# Patient Record
Sex: FEMALE | Race: WHITE | Employment: UNEMPLOYED | URBAN - NONMETROPOLITAN AREA
[De-identification: names, ages, dates, MRNs, and addresses within clinical notes are randomized per-mention and may not be internally consistent; named-entity substitution may affect disease eponyms.]

---

## 2020-10-03 ENCOUNTER — HOSPITAL ENCOUNTER (EMERGENCY)
Age: 60
Discharge: HOME | End: 2020-10-03
Attending: FAMILY MEDICINE

## 2020-10-03 ENCOUNTER — APPOINTMENT (OUTPATIENT)
Dept: GENERAL RADIOLOGY | Age: 60
End: 2020-10-03

## 2020-10-03 VITALS
WEIGHT: 174.9 LBS | DIASTOLIC BLOOD PRESSURE: 108 MMHG | RESPIRATION RATE: 20 BRPM | TEMPERATURE: 97.4 F | OXYGEN SATURATION: 97 % | HEART RATE: 84 BPM | SYSTOLIC BLOOD PRESSURE: 202 MMHG

## 2020-10-03 PROCEDURE — 99283 EMERGENCY DEPT VISIT LOW MDM: CPT

## 2020-10-03 PROCEDURE — 73140 X-RAY EXAM OF FINGER(S): CPT

## 2020-10-03 PROCEDURE — 99282 EMERGENCY DEPT VISIT SF MDM: CPT

## 2020-10-03 PROCEDURE — 26770 TREAT FINGER DISLOCATION: CPT

## 2020-10-03 PROCEDURE — 2500000003 HC RX 250 WO HCPCS: Performed by: FAMILY MEDICINE

## 2020-10-03 PROCEDURE — 73130 X-RAY EXAM OF HAND: CPT

## 2020-10-03 RX ORDER — LIDOCAINE HYDROCHLORIDE 10 MG/ML
5 INJECTION, SOLUTION INFILTRATION; PERINEURAL ONCE
Status: COMPLETED | OUTPATIENT
Start: 2020-10-03 | End: 2020-10-03

## 2020-10-03 RX ORDER — ASPIRIN 81 MG/1
81 TABLET ORAL DAILY
COMMUNITY

## 2020-10-03 RX ADMIN — LIDOCAINE HYDROCHLORIDE 5 ML: 10 INJECTION, SOLUTION INFILTRATION; PERINEURAL at 11:28

## 2020-10-03 ASSESSMENT — PAIN DESCRIPTION - ORIENTATION: ORIENTATION: LEFT

## 2020-10-03 ASSESSMENT — PAIN DESCRIPTION - PAIN TYPE: TYPE: ACUTE PAIN

## 2020-10-03 ASSESSMENT — PAIN SCALES - GENERAL: PAINLEVEL_OUTOF10: 5

## 2020-10-03 NOTE — ED NOTES
Dr Paige Ramos reduces left middle finger dislocation at bedside. Pt tolerates well. Post reductions films done at bedside.       Evelin Portillo RN  10/03/20 [id number]

## 2020-10-03 NOTE — ED NOTES
Wound to left middle finger cleansed with hibacleanse. Bandaid applied and static finger splint applied to stabilize left middle finger.  Splint secured with ace bandage      Esther Severino RN  10/03/20 2625

## 2020-10-03 NOTE — ED PROVIDER NOTES
eMERGENCY dEPARTMENT eNCOUnter        [address] [facility]    Chief Complaint   Patient presents with    Hand Injury     injury to left middle finger, was walking dog, dog ran after another dog and pts finger was injured by pulling on leash        HPI    Pricila Ceballos is a 56 y.o. female who presents with an injury to her left middle finger which occurred when she was walking her dog. Her dog ran after another dog and this pulled her finger from the leash. Patient's pain is described as being moderate in severity. REVIEW OF SYSTEMS    All systems reviewed and positives are in the HPI. PAST MEDICAL HISTORY    History reviewed. No pertinent past medical history. SURGICAL HISTORY    Past Surgical History:   Procedure Laterality Date    HYSTERECTOMY      TONSILLECTOMY         CURRENT MEDICATIONS    Current Outpatient Rx   Medication Sig Dispense Refill    aspirin 81 MG EC tablet Take 81 mg by mouth daily         ALLERGIES    No Known Allergies    FAMILY HISTORY    History reviewed. No pertinent family history.     SOCIAL HISTORY    Social History     Socioeconomic History    Marital status:      Spouse name: None    Number of children: None    Years of education: None    Highest education level: None   Occupational History    None   Social Needs    Financial resource strain: None    Food insecurity     Worry: None     Inability: None    Transportation needs     Medical: None     Non-medical: None   Tobacco Use    Smoking status: Never Smoker    Smokeless tobacco: Never Used   Substance and Sexual Activity    Alcohol use: None    Drug use: None    Sexual activity: None   Lifestyle    Physical activity     Days per week: None     Minutes per session: None    Stress: None   Relationships    Social connections     Talks on phone: None     Gets together: None     Attends Scientologist service: None     Active member of club or organization: None     Attends meetings of clubs or organizations: None     Relationship status: None    Intimate partner violence     Fear of current or ex partner: None     Emotionally abused: None     Physically abused: None     Forced sexual activity: None   Other Topics Concern    None   Social History Narrative    None       PHYSICAL EXAM    VITAL SIGNS: BP (!) 202/108   Pulse 84   Temp 97.4 °F (36.3 °C)   Resp 20   Wt 174 lb 14.4 oz (79.3 kg)   SpO2 97%   Constitutional:  Well developed, well nourished, moderate acute distress, non-toxic appearance   HENT:  Atraumatic, external ears normal, nose normal, oropharynx moist.  Neck- normal range of motion, no tenderness, supple   Respiratory:  No respiratory distress, normal breath sounds. Cardiovascular:  Normal rate, normal rhythm, no murmurs, no gallops, no rubs   GI:  Soft, nondistended, normal bowel sounds, nontender   Musculoskeletal: Swelling and tenderness left middle finger with decreased range of motion due to pain. Very superficial laceration of left middle finger. This was less than 0.5 cm in length and sutures were not needed. Integument:  Well hydrated, no rash   Neurologic:  Grossly intact. RADIOLOGY/PROCEDURES    Initial x-rays revealed dorsal dislocation of the third middle finger at the PIP joint. Postreduction film revealed successful interval reduction of the previously seen dorsal dislocation of the third middle phalanx without evidence of a fracture. Reduction of third middle finger dislocation was performed. Anesthesia was nerve block with 1% lidocaine. ED COURSE & MEDICAL DECISION MAKING    Pertinent Labs & Imaging studies reviewed. (See chart for details)  Splint applied in the ER. I spoke with Dr. Chayito Gonzalez, and he will see the patient as an outpatient. Patient was stable on discharge. FINAL IMPRESSION    1. Finger dislocation  2. [place]-neck deformity of finger of left hand. Summation      Patient Course: Dislocation was reduced in the ER.   Postreduction film revealed successful reduction with no fracture present. Splint was applied in the ER. Patient was stable on discharge. ED Medications administered this visit:    Medications   lidocaine 1 % injection 5 mL (5 mLs Intradermal Given 10/3/20 1128)       New Prescriptions from this visit:    Discharge Medication List as of 10/3/2020 12:13 PM          Follow-up:  Zohra Arana MD  [address]  [phone]    Call in 2 days          Final Impression:   1. Dislocation of finger, initial encounter    2.  [place]-neck deformity of finger of left hand               (Please note that portions of this note were completed with a voice recognition program.  Efforts were made to edit the dictations but occasionally words are mis-transcribed.)     Alex Honeycutt MD  10/03/20 1005